# Patient Record
Sex: FEMALE | Race: WHITE
[De-identification: names, ages, dates, MRNs, and addresses within clinical notes are randomized per-mention and may not be internally consistent; named-entity substitution may affect disease eponyms.]

---

## 2022-12-01 ENCOUNTER — HOSPITAL ENCOUNTER (OUTPATIENT)
Dept: HOSPITAL 95 - ORSCMMR | Age: 72
Discharge: HOME | End: 2022-12-01
Attending: OBSTETRICS & GYNECOLOGY
Payer: MEDICARE

## 2022-12-01 VITALS — BODY MASS INDEX: 32.58 KG/M2 | WEIGHT: 195.55 LBS | HEIGHT: 65 IN

## 2022-12-01 DIAGNOSIS — D25.1: ICD-10-CM

## 2022-12-01 DIAGNOSIS — J45.909: ICD-10-CM

## 2022-12-01 DIAGNOSIS — I10: ICD-10-CM

## 2022-12-01 DIAGNOSIS — N84.0: ICD-10-CM

## 2022-12-01 DIAGNOSIS — N83.291: ICD-10-CM

## 2022-12-01 DIAGNOSIS — D25.0: ICD-10-CM

## 2022-12-01 DIAGNOSIS — N95.0: Primary | ICD-10-CM

## 2022-12-01 DIAGNOSIS — Z79.899: ICD-10-CM

## 2022-12-01 DIAGNOSIS — N83.292: ICD-10-CM

## 2022-12-01 DIAGNOSIS — N80.03: ICD-10-CM

## 2022-12-01 PROCEDURE — 0UT74ZZ RESECTION OF BILATERAL FALLOPIAN TUBES, PERCUTANEOUS ENDOSCOPIC APPROACH: ICD-10-PCS | Performed by: OBSTETRICS & GYNECOLOGY

## 2022-12-01 PROCEDURE — 0UT94ZZ RESECTION OF UTERUS, PERCUTANEOUS ENDOSCOPIC APPROACH: ICD-10-PCS | Performed by: OBSTETRICS & GYNECOLOGY

## 2022-12-01 PROCEDURE — 0UT24ZZ RESECTION OF BILATERAL OVARIES, PERCUTANEOUS ENDOSCOPIC APPROACH: ICD-10-PCS | Performed by: OBSTETRICS & GYNECOLOGY

## 2022-12-01 PROCEDURE — A9270 NON-COVERED ITEM OR SERVICE: HCPCS

## 2022-12-01 PROCEDURE — 58571 TLH W/T/O 250 G OR LESS: CPT

## 2022-12-01 NOTE — NUR
PATIENT CAME BACK FROM PACU TODAY AT 1130.
POD 0 LAP TOTAL HYSTER
PATIENT IS A&OX4. VS ARE WNL AND IS ON RA. PATIENT REPORTS "BLOATING" PAIN AND
HAS BEEN GIVEN TYLENOL AND IV TORADOL. SHE HAS 4 LAP SITES WITH WOUND GLUE
THAT ARE C/D/I. SHE IS TOLERATING SMALL SIPS OF WATER AND SNACKING ON A FEW
CRACKERS. SHE IS A SBA TO THE BATHROOM AND HAS ALREADY VOIDED.
SHE IS CURRENTLY SITTING ON THE TOLIET. PULL CORD IS WITHIN REACH.

## 2022-12-01 NOTE — NUR
Assumed pt care from Lynette WOODWARD, pt to SDS to recover until surgical bed opens.
Pt denies pain but needs to void, commode at bedside pt up moving wellto
commode, 4 op sites noted with dermabond in place no drainage noted, pad and
mesh panties given for scant vag bleeding.
pt taking po fluids without problems, pt cont to deny pain.[id number] Edel WOODWARD
called for report pt going to room 212 pt sites cont to be clear vss and
denies pain.

## 2022-12-01 NOTE — NUR
DISCHARGE NOTE:
PATIENT WAS EDUCATED ON DISCHARGE INSTRUCTIONS. SHE VERBALIZED UNDERSTANDING
OF INSTRUCTIONS. BOTH IV'S WERE TAKEN OUT AND WNL. PAIN IS MANAGED WITH ORAL
PAIN MEDICATIONS. SHE IS TOLERATING PO INTAKE AND IS VOIDING. PATIENT HAS
AMBULATED UP AND DOWN THE HALLWAYS AS A SBA. VS ARE WNL AND IS ON RA. HER 4
LAP SITES ON HER ABD ARE C/D/I. PATIENT HAS SCANT AMOUNT OF BLOOD ON HER JACKIE
PAD. PATIENT IS GETTING DRESSED AND HAS PERSONAL ITEMS IN THE ROOM GATHERED.
SHE IS BEING WHEELCHAIRED OUT TO HER HUSBANDS CAR TO BE TAKEN HOME.

## 2022-12-01 NOTE — NUR
DISCHARGE NOTE
PT WAS GIVEN DISCHARGE INSTRUCTIONS/TEACHING AND VERBALIZED UNDERSTANDING.
PAIN IS BEING MANAGED WITH PO MEDS. PT IS DRESSED AND READY TO BE DISCHARGED,
ITEMS ARE GATHERED. NO SWELLING OR ABNORMAL DRAINAGE FROM INCISION SITES.
VAGINAL BLEEDING IS SCANT. PT AMBULATED HALLWAYS AS A STANDBY ASSIST. PT IS
TOLERATED PO INTAKE AND IS VOIDING. BOTH IVS REMOVED AND WNL. PT HAS NO
COMPLAINTS OF DIZZINESS OR NAUSEA WHEN TRANSFERING. PATIENT
TRANSFERED INDEPENDENTLY TO WHEELCHAIR AND PT'S  WILL BE DRIVING HER
HOME.

## 2022-12-01 NOTE — NUR
PT AWATING SURGICAL FLOOR BED, PT MOVED TO STEP LEVEL AFTER RECOVERED AND
STABLE. PT REMAINED IN MY CARE FROM [phone], PT W/O COMPLAINT, TOLERATING
ICE CHIPS, VSS. REPORT GIVEN TO BLAYNE DAVIS RN.

## 2022-12-01 NOTE — NUR
History, Chart, Medications and Allergies reviewed before start of
procedure.LUNGS CLEAR, PRE OP TEACHING DONE,